# Patient Record
Sex: FEMALE | Employment: FULL TIME | URBAN - METROPOLITAN AREA
[De-identification: names, ages, dates, MRNs, and addresses within clinical notes are randomized per-mention and may not be internally consistent; named-entity substitution may affect disease eponyms.]

---

## 2018-06-03 ENCOUNTER — HOSPITAL ENCOUNTER (EMERGENCY)
Age: 53
Discharge: HOME | End: 2018-06-03
Payer: COMMERCIAL

## 2018-06-03 ENCOUNTER — APPOINTMENT (OUTPATIENT)
Dept: GENERAL RADIOLOGY | Age: 53
End: 2018-06-03
Payer: COMMERCIAL

## 2018-06-03 VITALS
HEART RATE: 74 BPM | OXYGEN SATURATION: 98 % | WEIGHT: 165 LBS | SYSTOLIC BLOOD PRESSURE: 135 MMHG | TEMPERATURE: 98.2 F | RESPIRATION RATE: 16 BRPM | DIASTOLIC BLOOD PRESSURE: 69 MMHG | BODY MASS INDEX: 27.49 KG/M2 | HEIGHT: 65 IN

## 2018-06-03 DIAGNOSIS — J34.89 SINUS DRAINAGE: Primary | ICD-10-CM

## 2018-06-03 LAB
ANION GAP SERPL CALCULATED.3IONS-SCNC: 11 MMOL/L (ref 7–16)
BASOPHILS ABSOLUTE: 0.06 E9/L (ref 0–0.2)
BASOPHILS RELATIVE PERCENT: 1 % (ref 0–2)
BUN BLDV-MCNC: 13 MG/DL (ref 6–20)
CALCIUM SERPL-MCNC: 9.6 MG/DL (ref 8.6–10.2)
CHLORIDE BLD-SCNC: 102 MMOL/L (ref 98–107)
CO2: 28 MMOL/L (ref 22–29)
CREAT SERPL-MCNC: 0.7 MG/DL (ref 0.5–1)
EOSINOPHILS ABSOLUTE: 0.35 E9/L (ref 0.05–0.5)
EOSINOPHILS RELATIVE PERCENT: 5.9 % (ref 0–6)
GFR AFRICAN AMERICAN: >60
GFR NON-AFRICAN AMERICAN: >60 ML/MIN/1.73
GLUCOSE BLD-MCNC: 135 MG/DL (ref 74–109)
HCT VFR BLD CALC: 38.7 % (ref 34–48)
HEMOGLOBIN: 13.4 G/DL (ref 11.5–15.5)
IMMATURE GRANULOCYTES #: 0.01 E9/L
IMMATURE GRANULOCYTES %: 0.2 % (ref 0–5)
LYMPHOCYTES ABSOLUTE: 2.57 E9/L (ref 1.5–4)
LYMPHOCYTES RELATIVE PERCENT: 43.3 % (ref 20–42)
MCH RBC QN AUTO: 28.5 PG (ref 26–35)
MCHC RBC AUTO-ENTMCNC: 34.6 % (ref 32–34.5)
MCV RBC AUTO: 82.2 FL (ref 80–99.9)
MONOCYTES ABSOLUTE: 0.45 E9/L (ref 0.1–0.95)
MONOCYTES RELATIVE PERCENT: 7.6 % (ref 2–12)
NEUTROPHILS ABSOLUTE: 2.5 E9/L (ref 1.8–7.3)
NEUTROPHILS RELATIVE PERCENT: 42 % (ref 43–80)
PDW BLD-RTO: 11.9 FL (ref 11.5–15)
PLATELET # BLD: 243 E9/L (ref 130–450)
PMV BLD AUTO: 9.5 FL (ref 7–12)
POTASSIUM SERPL-SCNC: 4.1 MMOL/L (ref 3.5–5)
RBC # BLD: 4.71 E12/L (ref 3.5–5.5)
SODIUM BLD-SCNC: 141 MMOL/L (ref 132–146)
WBC # BLD: 5.9 E9/L (ref 4.5–11.5)

## 2018-06-03 PROCEDURE — 80048 BASIC METABOLIC PNL TOTAL CA: CPT

## 2018-06-03 PROCEDURE — 85025 COMPLETE CBC W/AUTO DIFF WBC: CPT

## 2018-06-03 PROCEDURE — 99285 EMERGENCY DEPT VISIT HI MDM: CPT

## 2018-06-03 PROCEDURE — 6370000000 HC RX 637 (ALT 250 FOR IP): Performed by: NURSE PRACTITIONER

## 2018-06-03 PROCEDURE — 71045 X-RAY EXAM CHEST 1 VIEW: CPT

## 2018-06-03 RX ORDER — LORATADINE 10 MG/1
10 TABLET ORAL DAILY
Qty: 30 TABLET | Refills: 0 | Status: SHIPPED | OUTPATIENT
Start: 2018-06-03 | End: 2021-01-21

## 2018-06-03 RX ADMIN — LIDOCAINE HYDROCHLORIDE: 20 SOLUTION ORAL; TOPICAL at 03:37

## 2019-06-03 ENCOUNTER — OFFICE VISIT (OUTPATIENT)
Dept: FAMILY MEDICINE CLINIC | Age: 54
End: 2019-06-03
Payer: COMMERCIAL

## 2019-06-03 VITALS
SYSTOLIC BLOOD PRESSURE: 124 MMHG | BODY MASS INDEX: 31.72 KG/M2 | HEART RATE: 90 BPM | WEIGHT: 172.38 LBS | HEIGHT: 62 IN | OXYGEN SATURATION: 94 % | TEMPERATURE: 99 F | DIASTOLIC BLOOD PRESSURE: 78 MMHG

## 2019-06-03 DIAGNOSIS — J30.1 SEASONAL ALLERGIC RHINITIS DUE TO POLLEN: ICD-10-CM

## 2019-06-03 DIAGNOSIS — J01.91 ACUTE RECURRENT SINUSITIS, UNSPECIFIED LOCATION: Primary | ICD-10-CM

## 2019-06-03 PROCEDURE — 99213 OFFICE O/P EST LOW 20 MIN: CPT | Performed by: FAMILY MEDICINE

## 2019-06-03 RX ORDER — CEFDINIR 300 MG/1
300 CAPSULE ORAL 2 TIMES DAILY
Qty: 20 CAPSULE | Refills: 0 | Status: SHIPPED | OUTPATIENT
Start: 2019-06-03 | End: 2019-06-13

## 2019-06-03 RX ORDER — PREDNISONE 10 MG/1
10 TABLET ORAL 2 TIMES DAILY
Qty: 10 TABLET | Refills: 0 | Status: SHIPPED | OUTPATIENT
Start: 2019-06-03 | End: 2019-06-08

## 2019-06-03 NOTE — LETTER
[facility], [facility]  [address] [place] [id number]  Phone: [phone]  Fax: [phone],         Zo 3, 2019     Patient: Ankush Esposito   YOB: 1965   Date of Visit: 6/3/2019       To Whom it May Concern:    Ankush Esposito was seen in my clinic on 6/3/2019. She may return to work on 06.04.19. If you have any questions or concerns, please don't hesitate to call.     Sincerely,         Gamal Turner DO

## 2019-06-03 NOTE — PROGRESS NOTES
days    continue claritin and flonase  Fluids and rest  Motrin as needed  If not feeling better in 2 to 3 days needs to follow up        Seen by:   Bisi Herndon, DO

## 2019-06-03 NOTE — LETTER
[facility], [place] [address] [place] [address]  Phone: [phone]  Fax: [phone],         Zo 3, 2019     Patient: Marii Conde   YOB: 1965   Date of Visit: 6/3/2019       To Whom it May Concern:    Marii Conde was seen in my clinic on 6/3/2019. She may return to work on 06/04/2019. If you have any questions or concerns, please don't hesitate to call.     Sincerely,         Michel Dickerson DO

## 2019-06-05 ENCOUNTER — OFFICE VISIT (OUTPATIENT)
Dept: FAMILY MEDICINE CLINIC | Age: 54
End: 2019-06-05
Payer: COMMERCIAL

## 2019-06-05 VITALS
BODY MASS INDEX: 28.16 KG/M2 | WEIGHT: 169 LBS | HEIGHT: 65 IN | DIASTOLIC BLOOD PRESSURE: 70 MMHG | HEART RATE: 81 BPM | OXYGEN SATURATION: 95 % | SYSTOLIC BLOOD PRESSURE: 138 MMHG | TEMPERATURE: 97.4 F

## 2019-06-05 DIAGNOSIS — J45.41 MODERATE PERSISTENT ASTHMA WITH ACUTE EXACERBATION: Primary | ICD-10-CM

## 2019-06-05 PROCEDURE — 94640 AIRWAY INHALATION TREATMENT: CPT | Performed by: FAMILY MEDICINE

## 2019-06-05 PROCEDURE — 99213 OFFICE O/P EST LOW 20 MIN: CPT | Performed by: FAMILY MEDICINE

## 2019-06-05 PROCEDURE — 96372 THER/PROPH/DIAG INJ SC/IM: CPT | Performed by: FAMILY MEDICINE

## 2019-06-05 RX ORDER — ALBUTEROL SULFATE 90 UG/1
2 AEROSOL, METERED RESPIRATORY (INHALATION) 4 TIMES DAILY PRN
Qty: 1 INHALER | Refills: 5 | Status: SHIPPED
Start: 2019-06-05 | End: 2020-08-31

## 2019-06-05 RX ORDER — BENZONATATE 100 MG/1
100 CAPSULE ORAL 3 TIMES DAILY PRN
Qty: 30 CAPSULE | Refills: 0 | Status: SHIPPED | OUTPATIENT
Start: 2019-06-05 | End: 2019-06-12

## 2019-06-05 RX ORDER — METHYLPREDNISOLONE ACETATE 40 MG/ML
40 INJECTION, SUSPENSION INTRA-ARTICULAR; INTRALESIONAL; INTRAMUSCULAR; SOFT TISSUE ONCE
Status: COMPLETED | OUTPATIENT
Start: 2019-06-05 | End: 2019-06-05

## 2019-06-05 RX ORDER — IPRATROPIUM BROMIDE AND ALBUTEROL SULFATE 2.5; .5 MG/3ML; MG/3ML
1 SOLUTION RESPIRATORY (INHALATION) ONCE
Status: COMPLETED | OUTPATIENT
Start: 2019-06-05 | End: 2019-06-05

## 2019-06-05 RX ADMIN — METHYLPREDNISOLONE ACETATE 40 MG: 40 INJECTION, SUSPENSION INTRA-ARTICULAR; INTRALESIONAL; INTRAMUSCULAR; SOFT TISSUE at 09:48

## 2019-06-05 RX ADMIN — IPRATROPIUM BROMIDE AND ALBUTEROL SULFATE 1 AMPULE: 2.5; .5 SOLUTION RESPIRATORY (INHALATION) at 09:47

## 2019-06-05 NOTE — PROGRESS NOTES
OFFICE NOTE    19  Name: Jose F Mccartneys  :1965   Sex:female   Age:54 y.o. SUBJECTIVE  Chief Complaint   Patient presents with    Cough     mostly dry, sometimes yellow     Sinusitis       HPI Was on cruise in [facility]. When got back flight cancelled due to weather and sat in airport for 2 days. Cough relentless and can't sleep. Was [place] on [place]. Review of Systems   Coughing, wheezing, chest tight. Worse when lays down, can't sleep. Has Flovent inhaler she uses    Current Outpatient Medications:     albuterol sulfate  (90 Base) MCG/ACT inhaler, Inhale 2 puffs into the lungs 4 times daily as needed for Wheezing, Disp: 1 Inhaler, Rfl: 5    benzonatate (TESSALON) 100 MG capsule, Take 1 capsule by mouth 3 times daily as needed for Cough, Disp: 30 capsule, Rfl: 0    cefdinir (OMNICEF) 300 MG capsule, Take 1 capsule by mouth 2 times daily for 10 days, Disp: 20 capsule, Rfl: 0    predniSONE (DELTASONE) 10 MG tablet, Take 1 tablet by mouth 2 times daily for 5 days, Disp: 10 tablet, Rfl: 0    loratadine (CLARITIN) 10 MG tablet, Take 1 tablet by mouth daily, Disp: 30 tablet, Rfl: 0  No Known Allergies    Past Medical History:   Diagnosis Date    Asthma      Past Surgical History:   Procedure Laterality Date    APPENDECTOMY      CHOLECYSTECTOMY       No family history on file. Social History     Tobacco History     Smoking Status  Never Smoker    Smokeless Tobacco Use  Never Used          Alcohol History     Alcohol Use Status  No          Drug Use     Drug Use Status  No          Sexual Activity     Sexually Active  Not Asked                OBJECTIVE  Vitals:    19 0851   BP: 138/70   Pulse: 81   Temp: 97.4 °F (36.3 °C)   SpO2: 95%   Weight: 169 lb (76.7 kg)   Height: 5' 5\" (1.651 m)        Body mass index is 28.12 kg/m². Patient is normal weight    No orders of the defined types were placed in this encounter.        EXAM   Physical Exam   Constitutional: She appears well-developed and well-nourished. HENT:   Right Ear: External ear normal.   Mouth/Throat: Oropharyngeal exudate present. Eyes: Conjunctivae are normal.   Neck: Normal range of motion. Cardiovascular: Normal rate, regular rhythm and normal heart sounds. Pulmonary/Chest: She has wheezes. Wheezing, laryngitis. Not retracting. Able to talk in complete sentences   Lymphadenopathy:     She has no cervical adenopathy. Skin: No rash noted. Yojana was seen today for cough and sinusitis. Diagnoses and all orders for this visit:    Moderate persistent asthma with acute exacerbation  -     ipratropium-albuterol (DUONEB) nebulizer solution 1 ampule  -     methylPREDNISolone acetate (DEPO-MEDROL) injection 40 mg  -     albuterol sulfate  (90 Base) MCG/ACT inhaler; Inhale 2 puffs into the lungs 4 times daily as needed for Wheezing  -     benzonatate (TESSALON) 100 MG capsule; Take 1 capsule by mouth 3 times daily as needed for Cough    Continue Cefdinir and prednisone and flovent. Given rescue inhaler, benzonatate and another methyl pred shot      Return if symptoms worsen or fail to improve.     Electronically signed by Kimber Person MD on 6/5/19 at 9:35 AM

## 2019-06-06 RX ORDER — IPRATROPIUM BROMIDE AND ALBUTEROL SULFATE 2.5; .5 MG/3ML; MG/3ML
1 SOLUTION RESPIRATORY (INHALATION) EVERY 4 HOURS
Qty: 360 ML | Refills: 1 | Status: SHIPPED | OUTPATIENT
Start: 2019-06-06 | End: 2019-07-31

## 2019-06-08 ENCOUNTER — OFFICE VISIT (OUTPATIENT)
Dept: FAMILY MEDICINE CLINIC | Age: 54
End: 2019-06-08
Payer: COMMERCIAL

## 2019-06-08 VITALS
DIASTOLIC BLOOD PRESSURE: 84 MMHG | SYSTOLIC BLOOD PRESSURE: 132 MMHG | WEIGHT: 168 LBS | BODY MASS INDEX: 27.99 KG/M2 | OXYGEN SATURATION: 99 % | HEART RATE: 78 BPM | TEMPERATURE: 97.8 F | HEIGHT: 65 IN

## 2019-06-08 DIAGNOSIS — J40 BRONCHITIS: Primary | ICD-10-CM

## 2019-06-08 DIAGNOSIS — J45.901 MODERATE ASTHMA WITH ACUTE EXACERBATION, UNSPECIFIED WHETHER PERSISTENT: ICD-10-CM

## 2019-06-08 DIAGNOSIS — J04.0 LARYNGITIS: ICD-10-CM

## 2019-06-08 PROCEDURE — 99214 OFFICE O/P EST MOD 30 MIN: CPT | Performed by: FAMILY MEDICINE

## 2019-06-08 PROCEDURE — 96372 THER/PROPH/DIAG INJ SC/IM: CPT | Performed by: FAMILY MEDICINE

## 2019-06-08 RX ORDER — FLUTICASONE PROPIONATE 110 UG/1
2 AEROSOL, METERED RESPIRATORY (INHALATION) 2 TIMES DAILY
Qty: 1 INHALER | Refills: 3 | Status: SHIPPED | OUTPATIENT
Start: 2019-06-08 | End: 2020-08-31

## 2019-06-08 RX ORDER — BROMPHENIRAMINE MALEATE, PSEUDOEPHEDRINE HYDROCHLORIDE, AND DEXTROMETHORPHAN HYDROBROMIDE 2; 30; 10 MG/5ML; MG/5ML; MG/5ML
5 SYRUP ORAL 4 TIMES DAILY PRN
Qty: 473 ML | COMMUNITY
Start: 2019-06-08 | End: 2019-06-08

## 2019-06-08 RX ORDER — BROMPHENIRAMINE MALEATE, PSEUDOEPHEDRINE HYDROCHLORIDE, AND DEXTROMETHORPHAN HYDROBROMIDE 2; 30; 10 MG/5ML; MG/5ML; MG/5ML
5 SYRUP ORAL 4 TIMES DAILY PRN
Qty: 473 ML | Refills: 0 | Status: SHIPPED | OUTPATIENT
Start: 2019-06-08 | End: 2019-06-18

## 2019-06-08 RX ORDER — METHYLPREDNISOLONE ACETATE 40 MG/ML
40 INJECTION, SUSPENSION INTRA-ARTICULAR; INTRALESIONAL; INTRAMUSCULAR; SOFT TISSUE ONCE
Status: COMPLETED | OUTPATIENT
Start: 2019-06-08 | End: 2019-06-08

## 2019-06-08 RX ORDER — PREDNISONE 20 MG/1
TABLET ORAL
Qty: 30 TABLET | Refills: 0 | Status: SHIPPED | OUTPATIENT
Start: 2019-06-08 | End: 2019-07-31

## 2019-06-08 RX ADMIN — METHYLPREDNISOLONE ACETATE 40 MG: 40 INJECTION, SUSPENSION INTRA-ARTICULAR; INTRALESIONAL; INTRAMUSCULAR; SOFT TISSUE at 10:14

## 2019-06-08 ASSESSMENT — ENCOUNTER SYMPTOMS
COUGH: 1
SHORTNESS OF BREATH: 0
VOICE CHANGE: 1
WHEEZING: 1
EYES NEGATIVE: 1
GASTROINTESTINAL NEGATIVE: 1

## 2019-06-08 NOTE — PROGRESS NOTES
[facility]) Physicians   Internal Medicine     2019  Mya Rolling : 1965 Sex: female  Age:54 y.o. Chief Complaint   Patient presents with    Cough    Hoarse    Laryngitis        HPI  Patient was seen through [facility] care twice over past week due to cough, congestion, sinusitis, bronchitis, was given cefdinir 300mg twice a day and neb along with prednisone 10 mg twice a day for 5 days and albuterol inhaler. Continues to have dry cough, now has no voice for past 3-4 days. Denies fever/chills. H/o asthma in the past. Wheezing and sob have improved. Using neb bid which has improved sob and congestion, but still with cough. Review of Systems   Constitutional: Negative. HENT: Positive for congestion and voice change. Complains of hoarseness   Eyes: Negative. Respiratory: Positive for cough and wheezing. Negative for shortness of breath. Cardiovascular: Negative. Gastrointestinal: Negative.           Current Outpatient Medications:     predniSONE (DELTASONE) 20 MG tablet, 3 po daily x 5 days, then 2 po daily for 5 days, then 1 po daily for 5 days, Disp: 30 tablet, Rfl: 0    brompheniramine-pseudoephedrine-DM 2-30-10 MG/5ML syrup, Take 5 mLs by mouth 4 times daily as needed for Cough, Disp: 473 mL, Rfl:     fluticasone (FLOVENT HFA) 110 MCG/ACT inhaler, Inhale 2 puffs into the lungs 2 times daily, Disp: 1 Inhaler, Rfl: 3    ipratropium-albuterol (DUONEB) 0.5-2.5 (3) MG/3ML SOLN nebulizer solution, Inhale 3 mLs into the lungs every 4 hours, Disp: 360 mL, Rfl: 1    albuterol sulfate  (90 Base) MCG/ACT inhaler, Inhale 2 puffs into the lungs 4 times daily as needed for Wheezing, Disp: 1 Inhaler, Rfl: 5    benzonatate (TESSALON) 100 MG capsule, Take 1 capsule by mouth 3 times daily as needed for Cough, Disp: 30 capsule, Rfl: 0    cefdinir (OMNICEF) 300 MG capsule, Take 1 capsule by mouth 2 times daily for 10 days, Disp: 20 capsule, Rfl: 0    loratadine (CLARITIN) 10 MG tablet, Take 1 tablet by mouth daily, Disp: 30 tablet, Rfl: 0    No Known Allergies    Past Medical History:   Diagnosis Date    Asthma        Past Surgical History:   Procedure Laterality Date    APPENDECTOMY      CHOLECYSTECTOMY         No family history on file. Social History     Socioeconomic History    Marital status:      Spouse name: Not on file    Number of children: Not on file    Years of education: Not on file    Highest education level: Not on file   Occupational History    Not on file   Social Needs    Financial resource strain: Not on file    Food insecurity:     Worry: Not on file     Inability: Not on file    Transportation needs:     Medical: Not on file     Non-medical: Not on file   Tobacco Use    Smoking status: Never Smoker    Smokeless tobacco: Never Used   Substance and Sexual Activity    Alcohol use: No    Drug use: No    Sexual activity: Not on file   Lifestyle    Physical activity:     Days per week: Not on file     Minutes per session: Not on file    Stress: Not on file   Relationships    Social connections:     Talks on phone: Not on file     Gets together: Not on file     Attends Latter-day service: Not on file     Active member of club or organization: Not on file     Attends meetings of clubs or organizations: Not on file     Relationship status: Not on file    Intimate partner violence:     Fear of current or ex partner: Not on file     Emotionally abused: Not on file     Physically abused: Not on file     Forced sexual activity: Not on file   Other Topics Concern    Not on file   Social History Narrative    Not on file       Vitals:    06/08/19 0954   BP: 132/84   Pulse: 78   Temp: 97.8 °F (36.6 °C)   SpO2: 99%   Weight: 168 lb (76.2 kg)   Height: 5' 5\" (1.651 m)       Exam:  Physical Exam   Constitutional: She appears well-developed and well-nourished. HENT:   Head: Normocephalic and atraumatic.    Eyes: Pupils are equal, round, and reactive to

## 2019-06-08 NOTE — PATIENT INSTRUCTIONS
Patient Education        Learning About Asthma  What is asthma? Asthma is a long-term condition that affects your breathing. It causes the airways that lead to the lungs to swell. People with asthma may have asthma attacks. During an asthma attack, the airways tighten and become narrower. This makes it hard to breathe, and you may wheeze or cough. If you have a bad asthma attack, you may need emergency care. Asthma affects people in different ways. Some people only have asthma attacks during allergy season, or when they breathe in cold air, or when they exercise. Others have many bad attacks that send them to the doctor often. What are the symptoms? Symptoms of asthma can be mild or severe. You may have mild attacks now and then, you may have severe symptoms every day, or you may have something in between. How often you have symptoms can also change. When you have asthma, you may:  · Wheeze, making a loud or soft whistling noise when you breathe in and out. · Cough a lot. · Feel tightness in your chest.  · Feel short of breath. · Have trouble sleeping because of coughing or having a hard time breathing. · Get tired quickly during exercise. Your symptoms may be worse at night. How can you prevent asthma attacks? Certain things can make asthma symptoms worse. These are called triggers. When you are around a trigger, an asthma attack is more likely. Common triggers include:  · Cigarette smoke or air pollution. · Things you are allergic to, such as:  ? Pollen, mold, or dust mites. ? Pet hair, skin, or saliva. · Illnesses, like colds, flu, or pneumonia. · Exercise. · Dry, cold air. Here are some ways to avoid a few common triggers:  · Do not smoke or allow others to smoke around you. If you need help quitting, talk to your doctor about stop-smoking programs and medicines. These can increase your chances of quitting for good.   · If there is a lot of pollution, pollen, or dust outside, stay at home warranty or liability for your use of this information. Patient Education        Bronchitis: Care Instructions  Your Care Instructions    Bronchitis is inflammation of the bronchial tubes, which carry air to the lungs. The tubes swell and produce mucus, or phlegm. The mucus and inflamed bronchial tubes make you cough. You may have trouble breathing. Most cases of bronchitis are caused by viruses like those that cause colds. Antibiotics usually do not help and they may be harmful. Bronchitis usually develops rapidly and lasts about 2 to 3 weeks in otherwise healthy people. Follow-up care is a key part of your treatment and safety. Be sure to make and go to all appointments, and call your doctor if you are having problems. It's also a good idea to know your test results and keep a list of the medicines you take. How can you care for yourself at home? · Take all medicines exactly as prescribed. Call your doctor if you think you are having a problem with your medicine. · Get some extra rest.  · Take an over-the-counter pain medicine, such as acetaminophen (Tylenol), ibuprofen (Advil, Motrin), or naproxen (Aleve) to reduce fever and relieve body aches. Read and follow all instructions on the label. · Do not take two or more pain medicines at the same time unless the doctor told you to. Many pain medicines have acetaminophen, which is Tylenol. Too much acetaminophen (Tylenol) can be harmful. · Take an over-the-counter cough medicine that contains dextromethorphan to help quiet a dry, hacking cough so that you can sleep. Avoid cough medicines that have more than one active ingredient. Read and follow all instructions on the label. · Breathe moist air from a humidifier, hot shower, or sink filled with hot water. The heat and moisture will thin mucus so you can cough it out. · Do not smoke. Smoking can make bronchitis worse.  If you need help quitting, talk to your doctor about stop-smoking programs and medicines. These can increase your chances of quitting for good. When should you call for help? Call 911 anytime you think you may need emergency care. For example, call if:    · You have severe trouble breathing.    Call your doctor now or seek immediate medical care if:    · You have new or worse trouble breathing.     · You cough up dark brown or bloody mucus (sputum).     · You have a new or higher fever.     · You have a new rash.    Watch closely for changes in your health, and be sure to contact your doctor if:    · You cough more deeply or more often, especially if you notice more mucus or a change in the color of your mucus.     · You are not getting better as expected. Where can you learn more? Go to https://Seelio.[employer]. org and sign in to your Brightstar account. Enter H333 in the Asetek box to learn more about \"Bronchitis: Care Instructions. \"     If you do not have an account, please click on the \"Sign Up Now\" link. Current as of: September 5, 2018  Content Version: 12.0  © 2975-4984 Healthwise, Incorporated. Care instructions adapted under license by [facility] ([facility]). If you have questions about a medical condition or this instruction, always ask your healthcare professional. [place] [address] any warranty or liability for your use of this information. Patient Education        Laryngitis: Care Instructions  Your Care Instructions    Laryngitis is an inflammation of the voice box (larynx) that causes your voice to become raspy or hoarse. It can be short-lived or long-lasting. Most of the time, laryngitis comes on quickly and lasts as long as 2 weeks. It is caused by overuse, irritation, or infection of the vocal cords inside the larynx. Some of the most common causes are a cold, the flu, or allergies. Loud talking, shouting, cheering, or singing also can cause laryngitis.  Stomach acid that backs up into the throat also can make you lose your voice. Resting your voice and taking other steps at home can help you get your voice back. Follow-up care is a key part of your treatment and safety. Be sure to make and go to all appointments, and call your doctor if you are having problems. It's also a good idea to know your test results and keep a list of the medicines you take. How can you care for yourself at home? · Follow your doctor's directions for treating the condition that caused you to lose your voice. If your doctor prescribed antibiotics, take them as directed. Do not stop taking them just because you feel better. You need to take the full course of antibiotics. · Before you use cough and cold medicines, check the label. They may not be safe for young children or for people with certain health problems. · Try to keep stomach acid from backing up into your throat. Do not eat just before bedtime. Reduce the amount of coffee and alcohol you drink, and eat healthy foods. Taking over-the-counter acid reducers can help when these steps are not enough. In some cases, you may need prescription medicine. · Rest your voice. You do not have to stop speaking, but use your voice as little as possible. Speak softly but do not whisper; whispering can bother your larynx more than speaking softly. Avoid talking on the telephone or trying to speak loudly. · Try not to clear your throat. This can cause more irritation of your larynx. Take an over-the-counter cough suppressant (if your doctor recommends it) if you have a dry cough that does not produce mucus. · Do not smoke or allow others to smoke around you. If you need help quitting, talk to your doctor about stop-smoking programs and medicines. These can increase your chances of quitting for good. · Use a humidifier or vaporizer to add moisture to your bedroom. Humidity helps to thin the mucus in the nasal membranes that causes stuffiness or postnasal drip.  Follow the directions for cleaning the machine. · Drink plenty of fluids, enough so that your urine is light yellow or clear like water. If you have kidney, heart, or liver disease and have to limit fluids, talk with your doctor before you increase the amount of fluids you drink. · Use saline (saltwater) nasal washes to help keep your nasal passages open and wash out mucus and bacteria. You can buy saline nose drops at a grocery store or drugstore. Or, you can make your own at home by mixing ½ teaspoon salt, 1 cup water (at room temperature), and ½ teaspoon baking soda. If you make your own, fill a bulb syringe with the solution, insert the tip into your nostril, and squeeze gently. Martene  your nose. When should you call for help? Call 911 anytime you think you may need emergency care. For example, call if:    · You have trouble breathing.    Call your doctor now or seek immediate medical care if:    · You have new or worse pain.     · You have trouble swallowing.    Watch closely for changes in your health, and be sure to contact your doctor if:    · You do not get better as expected. Where can you learn more? Go to https://Camalize SL.Fuelmaxx Inc. org and sign in to your Tachyon Networks account. Enter X700 in the Bill.com box to learn more about \"Laryngitis: Care Instructions. \"     If you do not have an account, please click on the \"Sign Up Now\" link. Current as of: October 21, 2018  Content Version: 12.0  © 5731-4069 Healthwise, Incorporated. Care instructions adapted under license by [facility] [employer] [facility] ([facility]). If you have questions about a medical condition or this instruction, always ask your healthcare professional. [place] [address] any warranty or liability for your use of this information.

## 2019-07-30 RX ORDER — VITAMIN B COMPLEX
TABLET ORAL 2 TIMES DAILY
COMMUNITY

## 2019-07-30 RX ORDER — FERROUS SULFATE 325(65) MG
325 TABLET ORAL
COMMUNITY
End: 2019-07-31

## 2019-07-31 ENCOUNTER — OFFICE VISIT (OUTPATIENT)
Dept: FAMILY MEDICINE CLINIC | Age: 54
End: 2019-07-31
Payer: COMMERCIAL

## 2019-07-31 VITALS
DIASTOLIC BLOOD PRESSURE: 70 MMHG | HEART RATE: 70 BPM | WEIGHT: 171 LBS | SYSTOLIC BLOOD PRESSURE: 118 MMHG | BODY MASS INDEX: 28.49 KG/M2 | HEIGHT: 65 IN | OXYGEN SATURATION: 99 % | TEMPERATURE: 98.4 F

## 2019-07-31 DIAGNOSIS — J34.89 SINUS DRAINAGE: ICD-10-CM

## 2019-07-31 DIAGNOSIS — E55.9 VITAMIN D DEFICIENCY: ICD-10-CM

## 2019-07-31 DIAGNOSIS — R73.01 IMPAIRED FASTING BLOOD SUGAR: ICD-10-CM

## 2019-07-31 DIAGNOSIS — E78.2 MIXED HYPERLIPIDEMIA: Primary | ICD-10-CM

## 2019-07-31 DIAGNOSIS — Z12.39 SCREENING FOR MALIGNANT NEOPLASM OF BREAST: ICD-10-CM

## 2019-07-31 DIAGNOSIS — J45.909 MODERATE ASTHMA, UNSPECIFIED WHETHER COMPLICATED, UNSPECIFIED WHETHER PERSISTENT: ICD-10-CM

## 2019-07-31 DIAGNOSIS — Z00.00 ENCOUNTER FOR WELL ADULT EXAM WITHOUT ABNORMAL FINDINGS: Primary | ICD-10-CM

## 2019-07-31 DIAGNOSIS — E07.9 THYROID DISEASE: ICD-10-CM

## 2019-07-31 PROBLEM — J04.0 LARYNGITIS: Status: RESOLVED | Noted: 2019-06-08 | Resolved: 2019-07-31

## 2019-07-31 PROBLEM — J45.901 MODERATE ASTHMA WITH ACUTE EXACERBATION: Status: RESOLVED | Noted: 2019-06-08 | Resolved: 2019-07-31

## 2019-07-31 PROBLEM — J40 BRONCHITIS: Status: RESOLVED | Noted: 2019-06-08 | Resolved: 2019-07-31

## 2019-07-31 PROCEDURE — 99396 PREV VISIT EST AGE 40-64: CPT | Performed by: FAMILY MEDICINE

## 2019-07-31 ASSESSMENT — ENCOUNTER SYMPTOMS
SORE THROAT: 1
EYE PAIN: 0
VOMITING: 0
BACK PAIN: 0
ABDOMINAL PAIN: 0
DIARRHEA: 0
NAUSEA: 0
CONSTIPATION: 0
WHEEZING: 0
SHORTNESS OF BREATH: 0
SINUS PAIN: 0
CHEST TIGHTNESS: 0
TROUBLE SWALLOWING: 0
COUGH: 0

## 2019-07-31 ASSESSMENT — PATIENT HEALTH QUESTIONNAIRE - PHQ9
SUM OF ALL RESPONSES TO PHQ QUESTIONS 1-9: 0
1. LITTLE INTEREST OR PLEASURE IN DOING THINGS: 0
2. FEELING DOWN, DEPRESSED OR HOPELESS: 0
SUM OF ALL RESPONSES TO PHQ QUESTIONS 1-9: 0
SUM OF ALL RESPONSES TO PHQ9 QUESTIONS 1 & 2: 0

## 2019-07-31 NOTE — PROGRESS NOTES
19    Name: Tavon Perez  :1965   Sex:female   Age:54 y.o. Chief Complaint   Patient presents with    Laryngitis     voice goes in and out since she was sick in      Patient presents for a check up    Has had problems this summer with her asthma  Multiple flare ups  Feeling better in the last few weeks but still has some drainage    Due for labs but wants to go else where, labs order printed    Also due for mammogram    Due for pap-was referred to DR Homa Diaz twice but she has not seen her  Was patient of Dr Adria Grande  Did tell her to call them to get an appt scheduled    Colonoscopy- Dr Diop Sharla    Review of Systems   Constitutional: Negative for appetite change, fatigue and fever. HENT: Positive for sore throat. Negative for congestion, ear pain, sinus pain and trouble swallowing. Eyes: Negative for pain. Respiratory: Negative for cough, chest tightness, shortness of breath and wheezing. Cardiovascular: Negative for chest pain, palpitations and leg swelling. Gastrointestinal: Negative for abdominal pain, constipation, diarrhea, nausea and vomiting. Endocrine: Negative for cold intolerance and heat intolerance. Genitourinary: Negative for difficulty urinating, hematuria and pelvic pain. Musculoskeletal: Negative for back pain, gait problem and joint swelling. Skin: Negative for rash and wound. Neurological: Negative for dizziness, syncope and headaches. Hematological: Negative for adenopathy. Psychiatric/Behavioral: Negative for confusion, sleep disturbance and suicidal ideas.            Current Outpatient Medications:     Cholecalciferol (VITAMIN D) 2000 units CAPS capsule, Take by mouth 2 times daily, Disp: , Rfl:     B Complex-Folic Acid (SUPER B COMPLEX MAXI) TABS, Take by mouth, Disp: , Rfl:     fluticasone (FLOVENT HFA) 110 MCG/ACT inhaler, Inhale 2 puffs into the lungs 2 times daily, Disp: 1 Inhaler, Rfl: 3    albuterol sulfate  (90 Base) MCG/ACT

## 2019-08-15 LAB
ALBUMIN SERPL-MCNC: 4.4 G/DL
ALP BLD-CCNC: 52 U/L
ALT SERPL-CCNC: 24 U/L
ANION GAP SERPL CALCULATED.3IONS-SCNC: NORMAL MMOL/L
AST SERPL-CCNC: 27 U/L
AVERAGE GLUCOSE: NORMAL
BASOPHILS ABSOLUTE: NORMAL /ΜL
BASOPHILS RELATIVE PERCENT: NORMAL %
BILIRUB SERPL-MCNC: 1 MG/DL (ref 0.1–1.4)
BUN BLDV-MCNC: 15 MG/DL
CALCIUM SERPL-MCNC: 9.5 MG/DL
CHLORIDE BLD-SCNC: 101 MMOL/L
CHOLESTEROL, TOTAL: 248 MG/DL
CHOLESTEROL/HDL RATIO: 2.1
CO2: 31 MMOL/L
CREAT SERPL-MCNC: 0.9 MG/DL
EOSINOPHILS ABSOLUTE: NORMAL /ΜL
EOSINOPHILS RELATIVE PERCENT: NORMAL %
GFR CALCULATED: 66
GLUCOSE BLD-MCNC: 115 MG/DL
HBA1C MFR BLD: 6.2 %
HCT VFR BLD CALC: 41.3 % (ref 36–46)
HDLC SERPL-MCNC: 72 MG/DL (ref 35–70)
HEMOGLOBIN: 14 G/DL (ref 12–16)
LDL CHOLESTEROL CALCULATED: 153 MG/DL (ref 0–160)
LYMPHOCYTES ABSOLUTE: NORMAL /ΜL
LYMPHOCYTES RELATIVE PERCENT: NORMAL %
MCH RBC QN AUTO: NORMAL PG
MCHC RBC AUTO-ENTMCNC: NORMAL G/DL
MCV RBC AUTO: NORMAL FL
MONOCYTES ABSOLUTE: NORMAL /ΜL
MONOCYTES RELATIVE PERCENT: NORMAL %
NEUTROPHILS ABSOLUTE: NORMAL /ΜL
NEUTROPHILS RELATIVE PERCENT: NORMAL %
PDW BLD-RTO: NORMAL %
PLATELET # BLD: NORMAL K/ΜL
PMV BLD AUTO: NORMAL FL
POTASSIUM SERPL-SCNC: 4.5 MMOL/L
RBC # BLD: 5.14 10^6/ΜL
SODIUM BLD-SCNC: 140 MMOL/L
TOTAL PROTEIN: 7.5
TRIGL SERPL-MCNC: 138 MG/DL
TSH SERPL DL<=0.05 MIU/L-ACNC: 2.88 UIU/ML
VITAMIN D 25-HYDROXY: 44.1
VITAMIN D2, 25 HYDROXY: NORMAL
VITAMIN D3,25 HYDROXY: NORMAL
VLDLC SERPL CALC-MCNC: ABNORMAL MG/DL
WBC # BLD: 4.8 10^3/ML

## 2019-08-22 DIAGNOSIS — Z12.39 SCREENING FOR MALIGNANT NEOPLASM OF BREAST: ICD-10-CM

## 2019-08-22 DIAGNOSIS — E78.2 MIXED HYPERLIPIDEMIA: ICD-10-CM

## 2019-08-22 DIAGNOSIS — E55.9 VITAMIN D DEFICIENCY: ICD-10-CM

## 2019-08-22 DIAGNOSIS — E07.9 THYROID DISEASE: ICD-10-CM

## 2019-08-22 DIAGNOSIS — R73.01 IMPAIRED FASTING BLOOD SUGAR: ICD-10-CM

## 2019-10-05 ENCOUNTER — OFFICE VISIT (OUTPATIENT)
Dept: FAMILY MEDICINE CLINIC | Age: 54
End: 2019-10-05
Payer: COMMERCIAL

## 2019-10-05 VITALS
DIASTOLIC BLOOD PRESSURE: 84 MMHG | SYSTOLIC BLOOD PRESSURE: 132 MMHG | TEMPERATURE: 97.4 F | WEIGHT: 171 LBS | OXYGEN SATURATION: 98 % | BODY MASS INDEX: 28.49 KG/M2 | HEART RATE: 61 BPM | HEIGHT: 65 IN

## 2019-10-05 DIAGNOSIS — J01.90 ACUTE BACTERIAL SINUSITIS: Primary | ICD-10-CM

## 2019-10-05 DIAGNOSIS — B96.89 ACUTE BACTERIAL SINUSITIS: Primary | ICD-10-CM

## 2019-10-05 PROCEDURE — 99213 OFFICE O/P EST LOW 20 MIN: CPT | Performed by: NURSE PRACTITIONER

## 2019-10-05 RX ORDER — CEFDINIR 300 MG/1
300 CAPSULE ORAL 2 TIMES DAILY
Qty: 14 CAPSULE | Refills: 0 | Status: SHIPPED | OUTPATIENT
Start: 2019-10-05 | End: 2019-10-12

## 2019-10-05 RX ORDER — METHYLPREDNISOLONE 4 MG/1
TABLET ORAL
Qty: 1 KIT | Refills: 0 | Status: SHIPPED | OUTPATIENT
Start: 2019-10-05 | End: 2020-01-14

## 2019-10-14 DIAGNOSIS — Z01.419 ENCOUNTER FOR GYNECOLOGICAL EXAMINATION WITHOUT ABNORMAL FINDING: Primary | ICD-10-CM

## 2019-10-22 ENCOUNTER — TELEPHONE (OUTPATIENT)
Dept: FAMILY MEDICINE CLINIC | Age: 54
End: 2019-10-22

## 2019-10-22 DIAGNOSIS — Z01.419 ENCOUNTER FOR GYNECOLOGICAL EXAMINATION WITHOUT ABNORMAL FINDING: Primary | ICD-10-CM

## 2020-01-14 ENCOUNTER — TELEPHONE (OUTPATIENT)
Dept: FAMILY MEDICINE CLINIC | Age: 55
End: 2020-01-14

## 2020-01-14 ENCOUNTER — OFFICE VISIT (OUTPATIENT)
Dept: FAMILY MEDICINE CLINIC | Age: 55
End: 2020-01-14
Payer: COMMERCIAL

## 2020-01-14 VITALS
BODY MASS INDEX: 28.82 KG/M2 | TEMPERATURE: 98.7 F | HEIGHT: 65 IN | HEART RATE: 93 BPM | SYSTOLIC BLOOD PRESSURE: 138 MMHG | WEIGHT: 173 LBS | OXYGEN SATURATION: 97 % | DIASTOLIC BLOOD PRESSURE: 80 MMHG

## 2020-01-14 PROCEDURE — 96372 THER/PROPH/DIAG INJ SC/IM: CPT | Performed by: INTERNAL MEDICINE

## 2020-01-14 PROCEDURE — G8427 DOCREV CUR MEDS BY ELIG CLIN: HCPCS | Performed by: INTERNAL MEDICINE

## 2020-01-14 PROCEDURE — 1036F TOBACCO NON-USER: CPT | Performed by: INTERNAL MEDICINE

## 2020-01-14 PROCEDURE — 3017F COLORECTAL CA SCREEN DOC REV: CPT | Performed by: INTERNAL MEDICINE

## 2020-01-14 PROCEDURE — G8417 CALC BMI ABV UP PARAM F/U: HCPCS | Performed by: INTERNAL MEDICINE

## 2020-01-14 PROCEDURE — G8484 FLU IMMUNIZE NO ADMIN: HCPCS | Performed by: INTERNAL MEDICINE

## 2020-01-14 PROCEDURE — 99213 OFFICE O/P EST LOW 20 MIN: CPT | Performed by: INTERNAL MEDICINE

## 2020-01-14 RX ORDER — BENZONATATE 100 MG/1
100 CAPSULE ORAL 3 TIMES DAILY PRN
Qty: 30 CAPSULE | Refills: 0 | Status: SHIPPED | OUTPATIENT
Start: 2020-01-14 | End: 2020-01-21

## 2020-01-14 RX ORDER — METHYLPREDNISOLONE ACETATE 40 MG/ML
40 INJECTION, SUSPENSION INTRA-ARTICULAR; INTRALESIONAL; INTRAMUSCULAR; SOFT TISSUE ONCE
Status: COMPLETED | OUTPATIENT
Start: 2020-01-14 | End: 2020-01-14

## 2020-01-14 RX ORDER — METHYLPREDNISOLONE 4 MG/1
TABLET ORAL
Qty: 1 KIT | Refills: 0 | Status: SHIPPED | OUTPATIENT
Start: 2020-01-14 | End: 2020-01-20

## 2020-01-14 RX ORDER — CEFDINIR 300 MG/1
300 CAPSULE ORAL 2 TIMES DAILY
Qty: 14 CAPSULE | Refills: 0 | Status: SHIPPED | OUTPATIENT
Start: 2020-01-14 | End: 2020-01-21

## 2020-01-14 RX ADMIN — METHYLPREDNISOLONE ACETATE 40 MG: 40 INJECTION, SUSPENSION INTRA-ARTICULAR; INTRALESIONAL; INTRAMUSCULAR; SOFT TISSUE at 10:07

## 2020-01-14 NOTE — TELEPHONE ENCOUNTER
Please let the patient know that chest x-ray per radiology report was normal.  No signs of pneumonia    Continue present treatment     Follow-up with family doctor in 1 week or sooner if not getting better    Thanks

## 2020-01-14 NOTE — PROGRESS NOTES
20  [facility] : 1965 Sex: female  Age: 49 y.o. Chief Complaint   Patient presents with    Cough     Symptoms started      Congestion    Generalized Body Aches       HPI: : Cough. The patient states that they have had a cough for the last 3-4 days. Cough is productive of sputum, light yellow color. The patient also reports nasal congestion. States sore throat. No nasal discharge. The patient has had general malaise. No fever or chills. Stats some body aches and muscle soreness. Patient does admit to chest discomfort with coughing. States chest tightness. No dyspnea. No nausea, vomiting, abdo pain or diarrhea. Dash has been taking avil and robitussin. States recently returned to [facility] from [facility]. Has h/o asthma. The patient presents for evaluation. ROS:  Const: Positives and pertinent negatives as per HPI. All others reviewed and are negative.         Current Outpatient Medications:     benzonatate (TESSALON) 100 MG capsule, Take 1 capsule by mouth 3 times daily as needed for Cough, Disp: 30 capsule, Rfl: 0    methylPREDNISolone (MEDROL DOSEPACK) 4 MG tablet, Take by mouth., Disp: 1 kit, Rfl: 0    cefdinir (OMNICEF) 300 MG capsule, Take 1 capsule by mouth 2 times daily for 7 days, Disp: 14 capsule, Rfl: 0    Vitamin D (CHOLECALCIFEROL) 25 MCG (1000 UT) TABS tablet, Take by mouth 2 times daily , Disp: , Rfl:     B Complex-Folic Acid (SUPER B COMPLEX MAXI) TABS, Take by mouth, Disp: , Rfl:     fluticasone (FLOVENT HFA) 110 MCG/ACT inhaler, Inhale 2 puffs into the lungs 2 times daily, Disp: 1 Inhaler, Rfl: 3    albuterol sulfate  (90 Base) MCG/ACT inhaler, Inhale 2 puffs into the lungs 4 times daily as needed for Wheezing, Disp: 1 Inhaler, Rfl: 5    loratadine (CLARITIN) 10 MG tablet, Take 1 tablet by mouth daily, Disp: 30 tablet, Rfl: 0  No Known Allergies    Past Medical History:   Diagnosis Date    Asthma     GERD (gastroesophageal reflux disease)    

## 2020-08-13 ENCOUNTER — TELEPHONE (OUTPATIENT)
Dept: FAMILY MEDICINE CLINIC | Age: 55
End: 2020-08-13

## 2020-08-21 LAB
25-HYDROXY VITAMIN D-3: 45.9 NG/ML (ref 30–100)
A/G RATIO: 1.7 RATIO (ref 1.1–2.2)
ALBUMIN SERPL-MCNC: 4.5 G/DL (ref 3.4–4.8)
ALP BLD-CCNC: 60 U/L (ref 42–121)
ALT SERPL-CCNC: 20 U/L (ref 10–54)
ANION GAP SERPL CALCULATED.3IONS-SCNC: 8 MEQ/L (ref 3–11)
AST SERPL-CCNC: 24 U/L (ref 10–41)
BASOPHILS ABSOLUTE: 0.1 K/UL (ref 0–0.2)
BASOPHILS RELATIVE PERCENT: 1 % (ref 0–1.5)
BILIRUB SERPL-MCNC: 0.9 MG/DL (ref 0.3–1.5)
BUN BLDV-MCNC: 15 MG/DL (ref 8–21)
CALCIUM SERPL-MCNC: 9.4 MG/DL (ref 8.5–10.5)
CHLORIDE BLD-SCNC: 102 MEQ/L (ref 98–107)
CHOLESTEROL: 245 MG/DL (ref 140–200)
CO2: 28 MEQ/L (ref 21–31)
CREAT SERPL-MCNC: 0.8 MG/DL (ref 0.4–1)
CREATININE + EGFR PANEL: 90 ML/MIN
EOSINOPHILS ABSOLUTE: 0.5 K/UL (ref 0–0.33)
EOSINOPHILS RELATIVE PERCENT: 9 % (ref 0–3)
GFR NON-AFRICAN AMERICAN: 75 ML/MIN
GLOBULIN: 2.7 G/DL (ref 1.9–3.9)
GLUCOSE BLD-MCNC: 111 MG/DL (ref 70–99)
HCT VFR BLD CALC: 41.6 % (ref 36–44)
HDLC SERPL-MCNC: 70 MG/DL (ref 35–85)
HEMOGLOBIN: 14.4 G/DL (ref 12–15)
LDL CHOLESTEROL: 152 MG/DL
LDL/HDL RATIO: 2.2 RATIO
LYMPHOCYTES ABSOLUTE: 2.6 K/UL (ref 1.1–4.8)
LYMPHOCYTES RELATIVE PERCENT: 49.4 % (ref 24–44)
MCH RBC QN AUTO: 28.1 PG (ref 28–34)
MCHC RBC AUTO-ENTMCNC: 34.6 G/DL (ref 33–37)
MCV RBC AUTO: 81.3 FL (ref 80–100)
MONOCYTES ABSOLUTE: 0.3 K/UL (ref 0.2–0.7)
MONOCYTES RELATIVE PERCENT: 5.8 % (ref 3.4–9)
NEUTROPHILS ABSOLUTE: 1.8 K/UL (ref 1.83–8.7)
PDW BLD-RTO: 12.7 % (ref 10.9–14.3)
PLATELET # BLD: 253 K/UL (ref 150–450)
PMV BLD AUTO: 7.8 FL (ref 7.4–10.4)
POTASSIUM SERPL-SCNC: 4.1 MEQ/L (ref 3.6–5)
RBC # BLD: 5.12 M/UL (ref 4–4.9)
SEGMENTED NEUTROPHILS RELATIVE PERCENT: 34.8 % (ref 40–74)
SODIUM BLD-SCNC: 138 MEQ/L (ref 135–145)
TOTAL PROTEIN: 7.2 G/DL (ref 5.9–7.8)
TRIGL SERPL-MCNC: 145 MG/DL (ref 41–189)
TSH SERPL DL<=0.05 MIU/L-ACNC: 3.78 UIU/ML (ref 0.34–5.6)
WBC # BLD: 5.2 K/UL (ref 4.5–11)

## 2020-08-22 LAB
ESTIMATED AVERAGE GLUCOSE: 117 MG/DL
HBA1C MFR BLD: 5.7 % (ref 4–6)

## 2020-08-31 ENCOUNTER — OFFICE VISIT (OUTPATIENT)
Dept: FAMILY MEDICINE CLINIC | Age: 55
End: 2020-08-31
Payer: COMMERCIAL

## 2020-08-31 VITALS
SYSTOLIC BLOOD PRESSURE: 110 MMHG | TEMPERATURE: 97.9 F | DIASTOLIC BLOOD PRESSURE: 60 MMHG | HEIGHT: 65 IN | OXYGEN SATURATION: 95 % | HEART RATE: 76 BPM | WEIGHT: 166.2 LBS | BODY MASS INDEX: 27.69 KG/M2

## 2020-08-31 PROCEDURE — 99396 PREV VISIT EST AGE 40-64: CPT | Performed by: FAMILY MEDICINE

## 2020-08-31 ASSESSMENT — ENCOUNTER SYMPTOMS
NAUSEA: 0
SHORTNESS OF BREATH: 0
SORE THROAT: 0
TROUBLE SWALLOWING: 0
ABDOMINAL PAIN: 0
EYE PAIN: 0
VOMITING: 0
COUGH: 0
BACK PAIN: 0
CONSTIPATION: 0
DIARRHEA: 0
WHEEZING: 0
CHEST TIGHTNESS: 0
SINUS PAIN: 0

## 2020-08-31 ASSESSMENT — PATIENT HEALTH QUESTIONNAIRE - PHQ9
1. LITTLE INTEREST OR PLEASURE IN DOING THINGS: 0
SUM OF ALL RESPONSES TO PHQ QUESTIONS 1-9: 0
SUM OF ALL RESPONSES TO PHQ9 QUESTIONS 1 & 2: 0
2. FEELING DOWN, DEPRESSED OR HOPELESS: 0
SUM OF ALL RESPONSES TO PHQ QUESTIONS 1-9: 0

## 2020-08-31 NOTE — PROGRESS NOTES
20    Name: Lela Esparza  :1965   Sex:female   Age:55 y.o. Chief Complaint   Patient presents with    Annual Exam     Patient presents to office for yearly check up. She did have labs done and mammo. Patient will have pap smear next week. Patient works at [employer], she says they have lost a lot of employees and her job has become more labor intensive. She will get aches and pains because she has to do a lot of lifting. Patient would like to discuss what supplements she should be taking. Patient here for check up    Had labs done    Lipids very high  We discussed treamtent at length and even though at our list visit she wantsed to try exercise and watching her diet she wants to do that again  She is resistant to statins  Even though her  is on one  They really do not watch their diet but she says she will try    Also allergies bother her again--but no flares of her asthma  But not taking anything  We have been around with this b/c she comes in often for allergy issues  Will have her restart the flonase and zyrtec and f/u in 1 ot 2 weeks if they are not getting better    Arthritis complaints  Taking tumeric  Did advise she try to take it every day  Here and there is not enough    Review of Systems   Constitutional: Negative for appetite change, fatigue and fever. HENT: Negative for congestion, ear pain, sinus pain, sore throat and trouble swallowing. Eyes: Negative for pain. Respiratory: Negative for cough, chest tightness, shortness of breath and wheezing. Cardiovascular: Negative for chest pain, palpitations and leg swelling. Gastrointestinal: Negative for abdominal pain, constipation, diarrhea, nausea and vomiting. Endocrine: Negative for cold intolerance and heat intolerance. Genitourinary: Negative for difficulty urinating, dysuria, frequency, hematuria and pelvic pain. Musculoskeletal: Positive for arthralgias and myalgias.  Negative for back pain, gait problem and joint swelling. Skin: Negative for rash and wound. Neurological: Negative for dizziness, syncope, light-headedness and headaches. Hematological: Negative for adenopathy. Psychiatric/Behavioral: Negative for confusion, dysphoric mood, self-injury, sleep disturbance and suicidal ideas. The patient is not nervous/anxious. Current Outpatient Medications:     Vitamin D (CHOLECALCIFEROL) 25 MCG (1000 UT) TABS tablet, Take by mouth 2 times daily , Disp: , Rfl:     B Complex-Folic Acid (SUPER B COMPLEX MAXI) TABS, Take by mouth, Disp: , Rfl:     loratadine (CLARITIN) 10 MG tablet, Take 1 tablet by mouth daily, Disp: 30 tablet, Rfl: 0  No Known Allergies   Past Medical History:   Diagnosis Date    Asthma     GERD (gastroesophageal reflux disease)     Hyperlipidemia     Iron deficiency     due to menstrual bleeding     Patient Active Problem List    Diagnosis Date Noted    Hyperlipidemia     Asthma 07/31/2019    Sinus drainage 06/03/2018      Past Surgical History:   Procedure Laterality Date    APPENDECTOMY      CHOLECYSTECTOMY      DILATION AND CURETTAGE OF UTERUS  2016    for heavy bleeding    OVARIAN CYST SURGERY        Social History     Tobacco History     Smoking Status  Never Smoker    Smokeless Tobacco Use  Never Used          Alcohol History     Alcohol Use Status  No          Drug Use     Drug Use Status  No          Sexual Activity     Sexually Active  Not Asked            /60   Pulse 76   Temp 97.9 °F (36.6 °C)   Ht 5' 5\" (1.651 m)   Wt 166 lb 3.2 oz (75.4 kg)   SpO2 95%   BMI 27.66 kg/m²     EXAM:   Physical Exam  Vitals signs and nursing note reviewed. Constitutional:       Appearance: Normal appearance. She is well-developed. HENT:      Head: Normocephalic and atraumatic.       Right Ear: Tympanic membrane normal.      Left Ear: Tympanic membrane normal.      Nose: Nose normal.      Mouth/Throat:      Mouth: Mucous membranes are moist.   Eyes:      Pupils: Pupils are equal, round, and reactive to light. Neck:      Musculoskeletal: Normal range of motion. Cardiovascular:      Rate and Rhythm: Normal rate and regular rhythm. Pulmonary:      Effort: Pulmonary effort is normal.      Breath sounds: Normal breath sounds. Abdominal:      General: Bowel sounds are normal.      Palpations: Abdomen is soft. Musculoskeletal:      Comments: Gait normal in the office today   Skin:     General: Skin is warm and dry. Neurological:      General: No focal deficit present. Mental Status: She is alert and oriented to person, place, and time. Psychiatric:         Mood and Affect: Mood normal.         Thought Content: Thought content normal.          Yojana was seen today for annual exam.    Diagnoses and all orders for this visit:    Encounter for well adult exam without abnormal findings    Mixed hyperlipidemia  Comments:  refuses statins  wants to work on diet and recheck in 6 months  Orders:  -     Lipid Panel; Future  -     Comprehensive Metabolic Panel; Future    Moderate asthma, unspecified whether complicated, unspecified whether persistent  Comments:  stale but needs to get allergies under control    Seasonal allergic rhinitis due to pollen  Comments:  zyrtec and flonase  f/uas needed    appt in 6 months to check cholesetrol    I independently reviewed and updated the chief complaint, HPI, past medical and surgical history, medications, allergies and ROS as entered by the LPN. Seen by:   Laura Velez DO

## 2020-09-02 ENCOUNTER — HOSPITAL ENCOUNTER (OUTPATIENT)
Age: 55
Discharge: HOME | End: 2020-09-04
Payer: COMMERCIAL

## 2020-09-02 PROCEDURE — 87624 HPV HI-RISK TYP POOLED RSLT: CPT

## 2020-09-02 PROCEDURE — 88175 CYTOPATH C/V AUTO FLUID REDO: CPT

## 2020-09-12 LAB
HPV SAMPLE: NORMAL
HPV TYPE 16: NOT DETECTED
HPV TYPE 18: NOT DETECTED
HPV, HIGH RISK OTHER: NOT DETECTED
INTERPRETATION: NORMAL
SOURCE: NORMAL

## 2020-10-30 ENCOUNTER — TELEPHONE (OUTPATIENT)
Dept: FAMILY MEDICINE CLINIC | Age: 55
End: 2020-10-30

## 2020-12-31 ENCOUNTER — OFFICE VISIT (OUTPATIENT)
Dept: FAMILY MEDICINE CLINIC | Age: 55
End: 2020-12-31
Payer: COMMERCIAL

## 2020-12-31 VITALS
HEART RATE: 76 BPM | HEIGHT: 65 IN | TEMPERATURE: 97.1 F | WEIGHT: 167 LBS | OXYGEN SATURATION: 98 % | BODY MASS INDEX: 27.82 KG/M2

## 2020-12-31 PROCEDURE — 1036F TOBACCO NON-USER: CPT | Performed by: FAMILY MEDICINE

## 2020-12-31 PROCEDURE — G8427 DOCREV CUR MEDS BY ELIG CLIN: HCPCS | Performed by: FAMILY MEDICINE

## 2020-12-31 PROCEDURE — G8417 CALC BMI ABV UP PARAM F/U: HCPCS | Performed by: FAMILY MEDICINE

## 2020-12-31 PROCEDURE — G8484 FLU IMMUNIZE NO ADMIN: HCPCS | Performed by: FAMILY MEDICINE

## 2020-12-31 PROCEDURE — 99213 OFFICE O/P EST LOW 20 MIN: CPT | Performed by: FAMILY MEDICINE

## 2020-12-31 PROCEDURE — 3017F COLORECTAL CA SCREEN DOC REV: CPT | Performed by: FAMILY MEDICINE

## 2020-12-31 RX ORDER — IBUPROFEN 600 MG/1
300 TABLET ORAL EVERY 6 HOURS PRN
Qty: 60 TABLET | Refills: 1 | Status: SHIPPED
Start: 2020-12-31 | End: 2021-01-21 | Stop reason: SINTOL

## 2020-12-31 RX ORDER — CYCLOBENZAPRINE HCL 10 MG
10 TABLET ORAL 3 TIMES DAILY PRN
Qty: 21 TABLET | Refills: 0 | Status: SHIPPED | OUTPATIENT
Start: 2020-12-31 | End: 2021-01-07

## 2020-12-31 RX ORDER — CETIRIZINE HYDROCHLORIDE 10 MG/1
10 TABLET ORAL DAILY
Qty: 30 TABLET | Refills: 1 | Status: SHIPPED
Start: 2020-12-31 | End: 2020-12-31 | Stop reason: CLARIF

## 2020-12-31 RX ORDER — METHYLPREDNISOLONE 4 MG/1
TABLET ORAL
Qty: 1 KIT | Refills: 0 | Status: SHIPPED
Start: 2020-12-31 | End: 2021-01-21

## 2020-12-31 NOTE — PROGRESS NOTES
20  [facility] : 1965 Sex: female  Age: 48 y.o. Chief Complaint   Patient presents with    Back Pain       Patient is a 31-year-old female who complains of pain in her lumbar spine upper thoracic spine both shoulders and her arms she states she does heavy lifting at work at [employer] or [employer] rather she is no radiculopathy she lives 34 to 50 pounds at a time. She has tight muscles and has some restricted range of motion due to muscle spasm and pain. Review of Systems   Respiratory: Negative. Cardiovascular: Negative. Musculoskeletal: Positive for arthralgias, back pain and myalgias. Skin: Negative. Current Outpatient Medications:     cetirizine (ZYRTEC) 10 MG tablet, Take 1 tablet by mouth daily, Disp: 30 tablet, Rfl: 1    ibuprofen (ADVIL;MOTRIN) 600 MG tablet, Take 0.5 tablets by mouth every 6 hours as needed for Pain, Disp: 60 tablet, Rfl: 1    Vitamin D (CHOLECALCIFEROL) 25 MCG (1000 UT) TABS tablet, Take by mouth 2 times daily , Disp: , Rfl:     B Complex-Folic Acid (SUPER B COMPLEX MAXI) TABS, Take by mouth, Disp: , Rfl:     loratadine (CLARITIN) 10 MG tablet, Take 1 tablet by mouth daily, Disp: 30 tablet, Rfl: 0  No Known Allergies    Past Medical History:   Diagnosis Date    Asthma     GERD (gastroesophageal reflux disease)     Hyperlipidemia     Iron deficiency     due to menstrual bleeding     Past Surgical History:   Procedure Laterality Date    APPENDECTOMY      CHOLECYSTECTOMY      DILATION AND CURETTAGE OF UTERUS  2016    for heavy bleeding    OVARIAN CYST SURGERY       No family history on file.   Social History     Tobacco Use    Smoking status: Never Smoker    Smokeless tobacco: Never Used   Substance Use Topics    Alcohol use: No    Drug use: No        Vitals:    20 0859   Pulse: 76   Temp: 97.1 °F (36.2 °C)   TempSrc: Temporal   SpO2: 98%   Weight: 167 lb (75.8 kg)   Height: 5' 5\" (1.651 m)       Physical Exam Vitals signs reviewed. Constitutional:       Appearance: Normal appearance. HENT:      Head: Normocephalic and atraumatic. Cardiovascular:      Rate and Rhythm: Normal rate and regular rhythm. Heart sounds: No murmur. Pulmonary:      Effort: Pulmonary effort is normal.      Breath sounds: Normal breath sounds. Musculoskeletal:         General: Tenderness present. No signs of injury. Skin:     General: Skin is warm and dry. Findings: No erythema or rash. Neurological:      Mental Status: She is alert. Assessment and Plan:  There are no diagnoses linked to this encounter. Orders Placed This Encounter   Medications    cetirizine (ZYRTEC) 10 MG tablet     Sig: Take 1 tablet by mouth daily     Dispense:  30 tablet     Refill:  1    ibuprofen (ADVIL;MOTRIN) 600 MG tablet     Sig: Take 0.5 tablets by mouth every 6 hours as needed for Pain     Dispense:  60 tablet     Refill:  1        Patient advised to follow up with PCP as needed.         Seen By:  Brice Arshad DO

## 2021-01-21 ENCOUNTER — OFFICE VISIT (OUTPATIENT)
Dept: FAMILY MEDICINE CLINIC | Age: 56
End: 2021-01-21
Payer: COMMERCIAL

## 2021-01-21 VITALS
HEART RATE: 80 BPM | DIASTOLIC BLOOD PRESSURE: 70 MMHG | SYSTOLIC BLOOD PRESSURE: 120 MMHG | OXYGEN SATURATION: 98 % | WEIGHT: 170.8 LBS | BODY MASS INDEX: 28.46 KG/M2 | TEMPERATURE: 98.4 F | HEIGHT: 65 IN

## 2021-01-21 DIAGNOSIS — J30.89 SEASONAL ALLERGIC RHINITIS DUE TO OTHER ALLERGIC TRIGGER: ICD-10-CM

## 2021-01-21 DIAGNOSIS — M62.838 MUSCLE SPASM: ICD-10-CM

## 2021-01-21 DIAGNOSIS — M54.9 UPPER BACK PAIN: Primary | ICD-10-CM

## 2021-01-21 DIAGNOSIS — J34.89 SINUS DRAINAGE: ICD-10-CM

## 2021-01-21 PROCEDURE — G8427 DOCREV CUR MEDS BY ELIG CLIN: HCPCS | Performed by: FAMILY MEDICINE

## 2021-01-21 PROCEDURE — 1036F TOBACCO NON-USER: CPT | Performed by: FAMILY MEDICINE

## 2021-01-21 PROCEDURE — 99214 OFFICE O/P EST MOD 30 MIN: CPT | Performed by: FAMILY MEDICINE

## 2021-01-21 PROCEDURE — G8417 CALC BMI ABV UP PARAM F/U: HCPCS | Performed by: FAMILY MEDICINE

## 2021-01-21 PROCEDURE — 3017F COLORECTAL CA SCREEN DOC REV: CPT | Performed by: FAMILY MEDICINE

## 2021-01-21 PROCEDURE — G8484 FLU IMMUNIZE NO ADMIN: HCPCS | Performed by: FAMILY MEDICINE

## 2021-01-21 RX ORDER — CYCLOBENZAPRINE HCL 5 MG
5 TABLET ORAL 2 TIMES DAILY PRN
Qty: 45 TABLET | Refills: 2 | Status: SHIPPED | OUTPATIENT
Start: 2021-01-21 | End: 2021-01-31

## 2021-01-21 ASSESSMENT — ENCOUNTER SYMPTOMS
BACK PAIN: 0
SINUS PAIN: 0
VOMITING: 0
CHEST TIGHTNESS: 0
ABDOMINAL PAIN: 0
SHORTNESS OF BREATH: 0
COUGH: 0
EYE PAIN: 0
NAUSEA: 0
SORE THROAT: 0
CONSTIPATION: 0
DIARRHEA: 0
TROUBLE SWALLOWING: 0
WHEEZING: 0

## 2021-01-21 ASSESSMENT — PATIENT HEALTH QUESTIONNAIRE - PHQ9: SUM OF ALL RESPONSES TO PHQ9 QUESTIONS 1 & 2: 0

## 2021-01-21 NOTE — PROGRESS NOTES
21    Name: Ramos Graham  :1965   Sex:female   Age:56 y.o. Chief Complaint   Patient presents with    Back Pain     Patient presents to office for visit. Patient was seen through express care at the end of December. She has been having muscle tightness inbetween her shoulders. Patient works at [employer] and does a lot of repetitive lifting. Patient says her symptoms did improve with the steroid and muscle relaxer. She only takes the muscle relaxer rarely and she has only taken two of the Ibuprofen 600 mg. Patient has tinnitus and says it worsened after taking the Ibuprofen. She denies pain in her neck or lower back. She denies numbness and tingling in her arms, hands, and shoulders. Patient her e for follow up on  Upper back pain  Feels like there are muscle spasms in upper back  She was off over [place] and for some reason while home they got worse    Now that she is back to work they seem a little better but at the end of the day they tend to bother her  She has tried the motrin she was prescribed but that cuases severe ringing in her ears  Took the steroid and did not notice a difference but she thinks the flexeril may be helping    Has been doing a few exercises at home to pull shoulders back  Discussed other options, she refuses physical therapy at this time  She can do tylenol 1000mg tid, flexeril will be sent in 5mg bid for the muscles  Massage therapy  This might really  Help and then also better posture  She needs to stop rolling shoulders forward, this is what is causing it she works partially bent over and shoulders rolled forward at work    Allergies bothering her  Forgot what I had told her to take  Continue zyrtec, humidfier and saline nasal spray  Also okay to restart flonase        Review of Systems   Constitutional: Negative for appetite change, fatigue and fever. HENT: Positive for postnasal drip. Negative for congestion, ear pain, sinus pain, sore throat and trouble swallowing. Eyes: Negative for pain. Respiratory: Negative for cough, chest tightness, shortness of breath and wheezing. Cardiovascular: Negative for chest pain, palpitations and leg swelling. Gastrointestinal: Negative for abdominal pain, constipation, diarrhea, nausea and vomiting. Endocrine: Negative for cold intolerance and heat intolerance. Genitourinary: Negative for difficulty urinating, dysuria, frequency, hematuria, pelvic pain and urgency. Musculoskeletal: Positive for myalgias. Negative for arthralgias, back pain, gait problem and joint swelling. Skin: Negative for rash and wound. Neurological: Negative for dizziness, syncope and headaches. Hematological: Negative for adenopathy. Psychiatric/Behavioral: Negative for confusion, dysphoric mood, self-injury, sleep disturbance and suicidal ideas.            Current Outpatient Medications:     cyclobenzaprine (FLEXERIL) 5 MG tablet, Take 1 tablet by mouth 2 times daily as needed for Muscle spasms, Disp: 45 tablet, Rfl: 2    Vitamin D (CHOLECALCIFEROL) 25 MCG (1000 UT) TABS tablet, Take by mouth 2 times daily , Disp: , Rfl:     B Complex-Folic Acid (SUPER B COMPLEX MAXI) TABS, Take by mouth, Disp: , Rfl:   Allergies   Allergen Reactions    Ibuprofen      Tinnitus      Past Medical History:   Diagnosis Date    Asthma     GERD (gastroesophageal reflux disease)     Hyperlipidemia     Iron deficiency     due to menstrual bleeding     Patient Active Problem List    Diagnosis Date Noted    Upper back pain 01/21/2021    Muscle spasm 01/21/2021    Hyperlipidemia     Asthma 07/31/2019    Sinus drainage 06/03/2018      Past Surgical History:   Procedure Laterality Date    APPENDECTOMY      CHOLECYSTECTOMY      DILATION AND CURETTAGE OF UTERUS  2016    for heavy bleeding    OVARIAN CYST SURGERY        Social History Tobacco History     Smoking Status  Never Smoker    Smokeless Tobacco Use  Never Used          Alcohol History     Alcohol Use Status  No          Drug Use     Drug Use Status  No          Sexual Activity     Sexually Active  Yes Partners  Male            /70   Pulse 80   Temp 98.4 °F (36.9 °C)   Ht 5' 5\" (1.651 m)   Wt 170 lb 12.8 oz (77.5 kg)   SpO2 98%   BMI 28.42 kg/m²     EXAM:   Physical Exam  Vitals signs and nursing note reviewed. Constitutional:       Appearance: Normal appearance. She is well-developed. HENT:      Head: Normocephalic and atraumatic. Right Ear: Tympanic membrane normal.      Left Ear: Tympanic membrane normal.      Nose: Nose normal.      Mouth/Throat:      Mouth: Mucous membranes are moist.   Eyes:      Pupils: Pupils are equal, round, and reactive to light. Neck:      Musculoskeletal: Normal range of motion. Cardiovascular:      Rate and Rhythm: Normal rate and regular rhythm. Pulmonary:      Effort: Pulmonary effort is normal.      Breath sounds: Normal breath sounds. Abdominal:      General: Bowel sounds are normal.      Palpations: Abdomen is soft. Musculoskeletal:      Comments: Gait normal in the office today, upper  over paraspinal muscles in upper thoracic area with spasms present, no ace abnormalities and no  Tenderness over spinous processes   Skin:     General: Skin is warm and dry. Neurological:      General: No focal deficit present. Mental Status: She is alert and oriented to person, place, and time. Psychiatric:         Mood and Affect: Mood normal.         Thought Content: Thought content normal.          Yojana was seen today for back pain.     Diagnoses and all orders for this visit:    Upper back pain  Comments:  still bothering her  recommendn tylenol 1000mg tid as needed  flexeril 5mg bid as needed  massage therapy, will refer if she needs referral    Muscle spasm  Comments:  still a problem she is trying to do stretches for posture at home but she does roll her shoulders forward a lot  flexril 5mg bid and massage therapy    Sinus drainage  Comments:  zyrtec, flonase, saline nasal spray    Seasonal allergic rhinitis due to other allergic trigger  Comments:  continue above meds    Other orders  -     cyclobenzaprine (FLEXERIL) 5 MG tablet; Take 1 tablet by mouth 2 times daily as needed for Muscle spasms    appt as needed, she thinks they are moving      I independently reviewed and updated the chief complaint, HPI, past medical and surgical history, medications, allergies and ROS as entered by the LPN. Seen by:   Candy Strong, DO

## 2021-03-09 LAB
A/G RATIO: 1.6 RATIO (ref 1.1–2.2)
ALBUMIN SERPL-MCNC: 4.5 G/DL (ref 3.4–4.8)
ALBUMIN SERPL-MCNC: NORMAL G/DL
ALP BLD-CCNC: 59 U/L (ref 42–121)
ALP BLD-CCNC: NORMAL U/L
ALT SERPL-CCNC: 24 U/L (ref 10–54)
ALT SERPL-CCNC: NORMAL U/L
ANION GAP SERPL CALCULATED.3IONS-SCNC: 11 MEQ/L (ref 3–11)
ANION GAP SERPL CALCULATED.3IONS-SCNC: NORMAL MMOL/L
AST SERPL-CCNC: 26 U/L (ref 10–41)
AST SERPL-CCNC: NORMAL U/L
BILIRUB SERPL-MCNC: 0.9 MG/DL (ref 0.3–1.5)
BILIRUB SERPL-MCNC: NORMAL MG/DL
BUN BLDV-MCNC: 15 MG/DL (ref 8–21)
BUN BLDV-MCNC: NORMAL MG/DL
CALCIUM SERPL-MCNC: 9.6 MG/DL (ref 8.5–10.5)
CALCIUM SERPL-MCNC: NORMAL MG/DL
CHLORIDE BLD-SCNC: 100 MEQ/L (ref 98–107)
CHLORIDE BLD-SCNC: NORMAL MMOL/L
CHOLESTEROL, TOTAL: 249 MG/DL
CHOLESTEROL/HDL RATIO: NORMAL
CHOLESTEROL: 249 MG/DL (ref 140–200)
CO2: 29 MEQ/L (ref 21–31)
CO2: NORMAL
CREAT SERPL-MCNC: 0.9 MG/DL (ref 0.4–1)
CREAT SERPL-MCNC: NORMAL MG/DL
CREATININE + EGFR PANEL: 79 ML/MIN
GFR CALCULATED: NORMAL
GFR NON-AFRICAN AMERICAN: 65 ML/MIN
GLOBULIN: 2.8 G/DL (ref 1.9–3.9)
GLUCOSE BLD-MCNC: 112 MG/DL (ref 70–99)
GLUCOSE BLD-MCNC: NORMAL MG/DL
HDLC SERPL-MCNC: 64 MG/DL (ref 35–70)
HDLC SERPL-MCNC: 64 MG/DL (ref 35–85)
LDL CHOLESTEROL CALCULATED: 147 MG/DL (ref 0–160)
LDL CHOLESTEROL: 147 MG/DL
LDL/HDL RATIO: 2.3 RATIO
NONHDLC SERPL-MCNC: NORMAL MG/DL
POTASSIUM SERPL-SCNC: 4.1 MEQ/L (ref 3.6–5)
POTASSIUM SERPL-SCNC: NORMAL MMOL/L
SODIUM BLD-SCNC: 140 MEQ/L (ref 135–145)
SODIUM BLD-SCNC: NORMAL MMOL/L
TOTAL PROTEIN: 7.3 G/DL (ref 5.9–7.8)
TOTAL PROTEIN: NORMAL
TRIGL SERPL-MCNC: 158 MG/DL
TRIGL SERPL-MCNC: 158 MG/DL (ref 41–189)
VLDLC SERPL CALC-MCNC: NORMAL MG/DL

## 2021-03-10 ENCOUNTER — TELEPHONE (OUTPATIENT)
Dept: FAMILY MEDICINE CLINIC | Age: 56
End: 2021-03-10

## 2021-03-10 DIAGNOSIS — E78.2 MIXED HYPERLIPIDEMIA: ICD-10-CM

## 2021-03-10 NOTE — TELEPHONE ENCOUNTER
pls notify her cholesterol still high  Really should be on a statin for it and cmp show liver kidneys normal but sugar is high

## 2021-03-11 NOTE — TELEPHONE ENCOUNTER
notified ok per communication form. She does decline statin . He states she will watch diet and walk out side.

## 2021-05-03 ENCOUNTER — OFFICE VISIT (OUTPATIENT)
Dept: FAMILY MEDICINE CLINIC | Age: 56
End: 2021-05-03
Payer: COMMERCIAL

## 2021-05-03 VITALS
HEIGHT: 65 IN | SYSTOLIC BLOOD PRESSURE: 110 MMHG | DIASTOLIC BLOOD PRESSURE: 60 MMHG | WEIGHT: 166.2 LBS | HEART RATE: 66 BPM | BODY MASS INDEX: 27.69 KG/M2 | TEMPERATURE: 97.9 F | OXYGEN SATURATION: 96 %

## 2021-05-03 DIAGNOSIS — S29.012D UPPER BACK STRAIN, SUBSEQUENT ENCOUNTER: ICD-10-CM

## 2021-05-03 DIAGNOSIS — R09.81 SINUS CONGESTION: Primary | ICD-10-CM

## 2021-05-03 DIAGNOSIS — J30.89 SEASONAL ALLERGIC RHINITIS DUE TO OTHER ALLERGIC TRIGGER: ICD-10-CM

## 2021-05-03 PROCEDURE — G8427 DOCREV CUR MEDS BY ELIG CLIN: HCPCS | Performed by: FAMILY MEDICINE

## 2021-05-03 PROCEDURE — 1036F TOBACCO NON-USER: CPT | Performed by: FAMILY MEDICINE

## 2021-05-03 PROCEDURE — 3017F COLORECTAL CA SCREEN DOC REV: CPT | Performed by: FAMILY MEDICINE

## 2021-05-03 PROCEDURE — G8417 CALC BMI ABV UP PARAM F/U: HCPCS | Performed by: FAMILY MEDICINE

## 2021-05-03 PROCEDURE — 99213 OFFICE O/P EST LOW 20 MIN: CPT | Performed by: FAMILY MEDICINE

## 2021-05-03 RX ORDER — PREDNISONE 10 MG/1
10 TABLET ORAL 2 TIMES DAILY
Qty: 14 TABLET | Refills: 0 | Status: SHIPPED | OUTPATIENT
Start: 2021-05-03 | End: 2021-05-10

## 2021-05-03 ASSESSMENT — ENCOUNTER SYMPTOMS
WHEEZING: 0
NAUSEA: 0
DIARRHEA: 0
EYE PAIN: 0
SHORTNESS OF BREATH: 0
SORE THROAT: 0
SINUS PAIN: 0
TROUBLE SWALLOWING: 0
VOMITING: 0
ABDOMINAL PAIN: 0
CONSTIPATION: 0
CHEST TIGHTNESS: 0
COUGH: 0
BACK PAIN: 0

## 2021-05-03 NOTE — PROGRESS NOTES
5/3/21    Name: Binu Maravilla  :1965   Sex:female   Age:56 y.o. Chief Complaint   Patient presents with    Sinus Problem     Patient presents to office for visit. Her daughter is with her. Patient has issues with mucus in her throat and she will cough up a lot of phlegm. She says the phlegm can be difficult to get out. She coughed up phlegm yesterday that looked as if it had blood in it and this has patient concerned. She denies chest tightness or pain. She denies chronic cough. She denies nasal congestion and rhinorrhea. Patient does work at [employer] and says she often has strong smells and steam that she is inhaling while at work. Patent here with daughter    Allergy issues in the last week or so  She has been taking over the counter allergy pill but has not use her flonase in a month  Has nasal congestion and feels sinus pressure off and on but not daily  She will cough and blow her nose a lot nad then the sinus congestion and head feels better  No fevers at home  No shortness of breath  Some sneezing  Voice cracks from time to time due to drainage  At times the drainage will have blood in it    Also still working at [employer] and does a lot of over head work and picking things up from in front of her, she works on the [employer] at the end of the day her back bothers her from time to time  Wondering if she needs xrays  But when she has a long weekend off she says her back feels good  Back discomfort is likely from work  Days when she works longer or dos more lifting are the days it seems to bother her the most  Will have her try voltaren gel topically and see ifthat helps        Review of Systems   Constitutional: Negative for appetite change, fatigue and fever. HENT: Negative for congestion, ear pain, sinus pain, sore throat and trouble swallowing. Eyes: Negative for pain. Respiratory: Negative for cough, chest tightness, shortness of breath and wheezing. Cardiovascular: Negative for chest pain, palpitations and leg swelling. Gastrointestinal: Negative for abdominal pain, constipation, diarrhea, nausea and vomiting. Endocrine: Negative for cold intolerance and heat intolerance. Genitourinary: Negative for difficulty urinating, dysuria, frequency, hematuria and pelvic pain. Musculoskeletal: Negative for arthralgias, back pain, gait problem and joint swelling. Skin: Negative for rash and wound. Neurological: Negative for dizziness, syncope, light-headedness and headaches. Hematological: Negative for adenopathy. Psychiatric/Behavioral: Negative for confusion, dysphoric mood, self-injury, sleep disturbance and suicidal ideas. The patient is not nervous/anxious.             Current Outpatient Medications:     predniSONE (DELTASONE) 10 MG tablet, Take 1 tablet by mouth 2 times daily for 7 days, Disp: 14 tablet, Rfl: 0    Vitamin D (CHOLECALCIFEROL) 25 MCG (1000 UT) TABS tablet, Take by mouth 2 times daily , Disp: , Rfl:     B Complex-Folic Acid (SUPER B COMPLEX MAXI) TABS, Take by mouth, Disp: , Rfl:   Allergies   Allergen Reactions    Ibuprofen      Tinnitus      Past Medical History:   Diagnosis Date    Asthma     GERD (gastroesophageal reflux disease)     Hyperlipidemia     Iron deficiency     due to menstrual bleeding     Patient Active Problem List    Diagnosis Date Noted    Upper back pain 01/21/2021    Muscle spasm 01/21/2021    Hyperlipidemia     Asthma 07/31/2019    Sinus drainage 06/03/2018      Past Surgical History:   Procedure Laterality Date    APPENDECTOMY      CHOLECYSTECTOMY      DILATION AND CURETTAGE OF UTERUS  2016    for heavy bleeding    OVARIAN CYST SURGERY        Social History     Tobacco History     Smoking Status  Never Smoker    Smokeless Tobacco Use  Never Used          Alcohol History     Alcohol Use Status  No          Drug Use     Drug Use Status  No          Sexual Activity     Sexually Active  Yes Partners Male            /60   Pulse 66   Temp 97.9 °F (36.6 °C)   Ht 5' 5\" (1.651 m)   Wt 166 lb 3.2 oz (75.4 kg)   SpO2 96%   BMI 27.66 kg/m²     EXAM:   Physical Exam  Vitals signs and nursing note reviewed. Constitutional:       General: She is not in acute distress. Appearance: Normal appearance. She is well-developed. She is not ill-appearing. HENT:      Head: Normocephalic and atraumatic. Right Ear: Tympanic membrane normal.      Left Ear: Tympanic membrane normal.      Nose: Congestion present. Comments: Turbinates boggy     Mouth/Throat:      Mouth: Mucous membranes are moist.      Pharynx: No oropharyngeal exudate. Eyes:      Pupils: Pupils are equal, round, and reactive to light. Neck:      Musculoskeletal: Normal range of motion. Cardiovascular:      Rate and Rhythm: Normal rate and regular rhythm. Pulmonary:      Effort: Pulmonary effort is normal.      Breath sounds: Normal breath sounds. Abdominal:      General: Bowel sounds are normal.      Palpations: Abdomen is soft. Musculoskeletal:      Comments: Gait steady and well balanced, normal back flexion and extension   Lymphadenopathy:      Cervical: No cervical adenopathy. Skin:     General: Skin is warm and dry. Neurological:      General: No focal deficit present. Mental Status: She is alert and oriented to person, place, and time. Psychiatric:         Mood and Affect: Mood normal.         Thought Content: Thought content normal.          Yojana was seen today for sinus problem.     Diagnoses and all orders for this visit:    Sinus congestion  Comments:  llergy pill daily  nasal spray more often and prednisone taper with food  if still bothering her f/u    Seasonal allergic rhinitis due to other allergic trigger  Comments:  continue allergy pill daily but needs to add nasal spray more often    Upper back strain, subsequent encounter  Comments:  likely for overhead work and lifting  voltaren gel    Other orders  -     predniSONE (DELTASONE) 10 MG tablet; Take 1 tablet by mouth 2 times daily for 7 days        I independently reviewed and updated the chief complaint, HPI, past medical and surgical history, medications, allergies and ROS as entered by the LPN. Seen by:   Esthela Calderon DO